# Patient Record
Sex: MALE | Race: WHITE | NOT HISPANIC OR LATINO | URBAN - METROPOLITAN AREA
[De-identification: names, ages, dates, MRNs, and addresses within clinical notes are randomized per-mention and may not be internally consistent; named-entity substitution may affect disease eponyms.]

---

## 2020-05-19 ENCOUNTER — TELEHEALTH PROVIDED OTHER THAN IN PATIENT'S HOME (OUTPATIENT)
Dept: URBAN - METROPOLITAN AREA TELEHEALTH 7 | Facility: TELEHEALTH | Age: 45
End: 2020-05-19
Payer: COMMERCIAL

## 2020-05-19 VITALS — HEIGHT: 71 IN | WEIGHT: 198 LBS

## 2020-05-19 DIAGNOSIS — R10.13 EPIGASTRIC PAIN: ICD-10-CM

## 2020-05-19 DIAGNOSIS — I10 ESSENTIAL (PRIMARY) HYPERTENSION: ICD-10-CM

## 2020-05-19 DIAGNOSIS — R12 HEARTBURN: ICD-10-CM

## 2020-05-19 PROCEDURE — 99244 OFF/OP CNSLTJ NEW/EST MOD 40: CPT | Mod: 95 | Performed by: PHYSICIAN ASSISTANT

## 2020-05-19 NOTE — SERVICEHPINOTES
PATIENT VERIFIED BY DATE OF BIRTH AND NAME. Patient has been consented for this telecommunication visit. Reviewed PmHx, FmHx, SHx. Mr. Galdino Sandra is a 44 yo white male with h/o HTN, HLD, and anxiety  that present for evaluation concerning "abdominal discomfort/heartburn" that began in 11/2019. He was initially seen at an urgent care in 11/2020 that suspected GERD etiology so advised start of Prilosec 20 mg qd and elimination of ETOH/coffee. He notes improvement to his heartburn but no change to his "substernal pressure" (patient points to epigastric region) on the Prilosec 20 mg qd taken in the mornings 30-60 min prior to breakfast. He describes a sub-sternal "pressure" sensation, lasting several hours that is triggered after meals: No interference with sleep. He eats dinner by 6 pm and avoids late night meals. He has daily BMs, well formed. He also notes h/o anxiety along with manifestation including "heart palpitations" occurring every few days that last a few seconds in duration, then resolve on their own. Fm h/o Rob[place]o's esophagus in father age of onset 50 w/ hx of tobacco abuse. Rare NSAID use. No tobacco/ETOH abuse. He is very physically active with weekly cardio workouts. No known cardiac or pulmonary disease. No prior surgical hx. Review of systems performed (see below). Denies n/v, regurgitation, dysphagia, odynophagia, decreased appetite, bloating, melena, rectal bleeding, weight loss. No other GI complaints. BR

## 2024-03-07 ENCOUNTER — TRANSCRIBE ORDERS (OUTPATIENT)
Facility: HOSPITAL | Age: 49
End: 2024-03-07

## 2024-03-07 DIAGNOSIS — Z00.00 PREVENTATIVE HEALTH CARE: Primary | ICD-10-CM

## 2024-03-19 ENCOUNTER — HOSPITAL ENCOUNTER (OUTPATIENT)
Facility: HOSPITAL | Age: 49
Discharge: HOME | End: 2024-03-22
Attending: SPECIALIST

## 2024-03-19 DIAGNOSIS — Z00.00 PREVENTATIVE HEALTH CARE: ICD-10-CM

## 2024-03-19 PROCEDURE — 75571 CT HRT W/O DYE W/CA TEST: CPT
